# Patient Record
Sex: FEMALE | Race: WHITE
[De-identification: names, ages, dates, MRNs, and addresses within clinical notes are randomized per-mention and may not be internally consistent; named-entity substitution may affect disease eponyms.]

---

## 2017-04-27 LAB
ANION GAP SERPL CALC-SCNC: 3 MMOL/L (ref 3–11)
BASOPHILS # BLD: 0.03 K/UL (ref 0–0.2)
BASOPHILS NFR BLD: 0.5 %
BUN SERPL-MCNC: 21 MG/DL (ref 7–18)
BUN/CREAT SERPL: 6.1 (ref 10–20)
CALCIUM SERPL-MCNC: 8.9 MG/DL (ref 8.5–10.1)
CHLORIDE SERPL-SCNC: 103 MMOL/L (ref 98–107)
CO2 SERPL-SCNC: 34 MMOL/L (ref 21–32)
COMPLETE: YES
CREAT CL PREDICTED SERPL C-G-VRATE: 9.5 ML/MIN
CREAT SERPL-MCNC: 3.5 MG/DL (ref 0.6–1.2)
EOSINOPHIL NFR BLD AUTO: 201 K/UL (ref 130–400)
GLUCOSE SERPL-MCNC: 84 MG/DL (ref 70–99)
HCT VFR BLD CALC: 32.4 % (ref 37–47)
IG%: 0.2 %
IMM GRANULOCYTES NFR BLD AUTO: 24.6 %
LYMPHOCYTES # BLD: 1.36 K/UL (ref 1.2–3.4)
MCH RBC QN AUTO: 30.7 PG (ref 25–34)
MCHC RBC AUTO-ENTMCNC: 32.7 G/DL (ref 32–36)
MCV RBC AUTO: 93.9 FL (ref 80–100)
MONOCYTES NFR BLD: 8.5 %
NEUTROPHILS # BLD AUTO: 2.2 %
NEUTROPHILS NFR BLD AUTO: 64 %
PMV BLD AUTO: 10 FL (ref 7.4–10.4)
POTASSIUM SERPL-SCNC: 3.5 MMOL/L (ref 3.5–5.1)
RBC # BLD AUTO: 3.45 M/UL (ref 4.2–5.4)
SODIUM SERPL-SCNC: 140 MMOL/L (ref 136–145)
WBC # BLD AUTO: 5.52 K/UL (ref 4.8–10.8)

## 2017-04-27 NOTE — PAT MEDICATION INSTRUCTIONS
Service Date


Apr 27, 2017.





Current Home Medication List


Acetaminophen (Tylenol), 650 MG PO Q4 PRN for Pain


Amlodipine (Norvasc), 5 MG PO BID


Carvedilol (Coreg), 25 MG PO BID


Cholecalciferol (Vitamin D3), 1 CAP PO QAM


Ondansetron Hcl (Zofran), 8 MG PO UD


Simethicone (Gas Relief Extra Strength), 1 CAP PO DAILY PRN for PRN


Sulfa/Trimethoprim (Bactrim Ds 800MG/160MG), 1 TAB PO QAM


[Trinephrocaps], 1 MG PO HS





Medication Instructions


For Your Scheduled Surgery 





Sulfa/Trimethoprim (Bactrim Ds 800MG/160MG), 1 TAB PO QAM (taking due to UTI- 

will finish prior to surgery)








- Hold the following medications the morning of surgery:


   Simethicone (Gas Relief Extra Strength), 1 CAP PO DAILY PRN for PRN


   Cholecalciferol (Vitamin D3), 1 CAP PO QAM





- Take the following medications the morning of surgery with a sip of water:


   Ondansetron Hcl (Zofran), 8 MG PO UD (only if needed)


   Acetaminophen (Tylenol), 650 MG PO Q4 PRN for Pain (if needed)


   Amlodipine (Norvasc), 5 MG PO BID


   Carvedilol (Coreg), 25 MG PO BID





- Take the following medications as scheduled the night before surgery:


   Simethicone (Gas Relief Extra Strength), 1 CAP PO DAILY PRN for PRN


   Ondansetron Hcl (Zofran), 8 MG PO UD


   Trinephrocaps 1 MG PO HS


   Acetaminophen (Tylenol), 650 MG PO Q4 PRN for Pain


   Amlodipine (Norvasc), 5 MG PO BID


   Carvedilol (Coreg), 25 MG PO BID











If you have any questions please call us at [phone] or [phone] (

[facility]) or [phone]

## 2017-05-11 ENCOUNTER — HOSPITAL ENCOUNTER (OUTPATIENT)
Dept: HOSPITAL 45 - C.ACU | Age: 82
Discharge: HOME | End: 2017-05-11
Attending: UROLOGY
Payer: COMMERCIAL

## 2017-05-11 VITALS
TEMPERATURE: 97.52 F | OXYGEN SATURATION: 96 % | HEART RATE: 56 BPM | DIASTOLIC BLOOD PRESSURE: 69 MMHG | SYSTOLIC BLOOD PRESSURE: 157 MMHG

## 2017-05-11 VITALS
DIASTOLIC BLOOD PRESSURE: 53 MMHG | TEMPERATURE: 97.88 F | OXYGEN SATURATION: 98 % | SYSTOLIC BLOOD PRESSURE: 164 MMHG | HEART RATE: 61 BPM

## 2017-05-11 VITALS
OXYGEN SATURATION: 96 % | SYSTOLIC BLOOD PRESSURE: 141 MMHG | TEMPERATURE: 97.7 F | DIASTOLIC BLOOD PRESSURE: 66 MMHG | HEART RATE: 56 BPM

## 2017-05-11 VITALS
HEIGHT: 62.99 IN | BODY MASS INDEX: 20.55 KG/M2 | BODY MASS INDEX: 20.55 KG/M2 | WEIGHT: 115.96 LBS | WEIGHT: 115.96 LBS | HEIGHT: 62.99 IN

## 2017-05-11 VITALS
OXYGEN SATURATION: 94 % | HEART RATE: 54 BPM | SYSTOLIC BLOOD PRESSURE: 159 MMHG | DIASTOLIC BLOOD PRESSURE: 67 MMHG | TEMPERATURE: 97.88 F

## 2017-05-11 VITALS — DIASTOLIC BLOOD PRESSURE: 66 MMHG | OXYGEN SATURATION: 94 % | HEART RATE: 56 BPM | SYSTOLIC BLOOD PRESSURE: 159 MMHG

## 2017-05-11 VITALS
SYSTOLIC BLOOD PRESSURE: 153 MMHG | DIASTOLIC BLOOD PRESSURE: 63 MMHG | TEMPERATURE: 97.88 F | OXYGEN SATURATION: 94 % | HEART RATE: 56 BPM

## 2017-05-11 DIAGNOSIS — Z85.3: ICD-10-CM

## 2017-05-11 DIAGNOSIS — C78.00: ICD-10-CM

## 2017-05-11 DIAGNOSIS — C65.2: ICD-10-CM

## 2017-05-11 DIAGNOSIS — I10: ICD-10-CM

## 2017-05-11 DIAGNOSIS — E78.00: ICD-10-CM

## 2017-05-11 DIAGNOSIS — I09.9: ICD-10-CM

## 2017-05-11 DIAGNOSIS — R91.1: ICD-10-CM

## 2017-05-11 DIAGNOSIS — N39.0: ICD-10-CM

## 2017-05-11 DIAGNOSIS — C67.5: Primary | ICD-10-CM

## 2017-05-11 LAB
ANION GAP SERPL CALC-SCNC: 7 MMOL/L (ref 3–11)
BUN SERPL-MCNC: 21 MG/DL (ref 7–18)
BUN/CREAT SERPL: 7.6 (ref 10–20)
CALCIUM SERPL-MCNC: 8.9 MG/DL (ref 8.5–10.1)
CHLORIDE SERPL-SCNC: 102 MMOL/L (ref 98–107)
CO2 SERPL-SCNC: 31 MMOL/L (ref 21–32)
CREAT CL PREDICTED SERPL C-G-VRATE: 11.9 ML/MIN
CREAT SERPL-MCNC: 2.8 MG/DL (ref 0.6–1.2)
GLUCOSE SERPL-MCNC: 104 MG/DL (ref 70–99)
POTASSIUM SERPL-SCNC: 3.5 MMOL/L (ref 3.5–5.1)
SODIUM SERPL-SCNC: 140 MMOL/L (ref 136–145)

## 2017-05-11 NOTE — OPERATIVE REPORT
DATE OF OPERATION:  05/11/2017

 

PREOPERATIVE DIAGNOSIS:  Recurrent bladder tumor.

 

POSTOPERATIVE DIAGNOSIS:  Recurrent circumferential bladder neck tumor.

 

PROCEDURE:  Transurethral resection of bladder tumor and instillation of

mitomycin C, 5-6 cm of total tumor volume circumferentially.

 

SURGEON:  Dr. Isak Nelson.

 

ASSISTANT:  None.

 

ANESTHESIA:  General anesthesia with laryngeal mask.

 

COMPLICATIONS:  None.

 

ESTIMATED BLOOD LOSS:  10 mL.

 

SPECIMENS SENT TO PATHOLOGY:  Bladder neck tumor and deeper tumor resection.

 

DRAINS LEFT IN PLACE:  Include a 16 Polish catheter to gravity drainage with

10 mL of sterile water in the balloon with 40 mg of mitomycin and 40 mL of

water.

 

FINDINGS:  Circumferential bladder neck involvement of the tumor.  All distal

to the ureteral orifices with no deeper bladder involvement, resected and

fulgurated in its entirety.  Intravesical mitomycin x2 hours postoperatively

40 mg.

 

BRIEF HISTORY:  Ms. Ortiz is a pleasant 86-year-old  female who I

have seen previously for history of left urothelial carcinoma resected with a

nephroureterectomy in February 2016 and a subsequent bladder recurrence in

July of 2016 who  has been followed as an outpatient.  She has recently

started external beam radiation therapy for  small lung lesions unchanged on

recent CT scan.  She is being brought back into the office seeing the

presence of a bladder recurrence on her screening office cystoscopy. 

Previous histology was high grade a year ago.  

 

Intravenous ciprofloxacin was provided for antibiotic coverage.  The patient

has been covered with doxycycline for positive urine culture preoperatively. 

SCDs used for DVT prophylaxis.  Informed consent reviewed with the patient

and family preoperatively today.

 

OPERATION AND FINDINGS: 

 

PROCEDURE:  The patient was properly identified and brought to the operative

suite.  After identification and appropriate consent on the chart, general

anesthesia with laryngeal mask was initiated.  The patient was prepped and

draped in standard fashion for this procedure.  Full time-out procedure was

followed.  Monopolar resectoscope was introduced into the bladder using a

visual obturator under direct visualization and bladder was surveyed in its

entirety including 30 and 70 degree lenses.  This demonstrated a

circumferential bladder neck  abnormal tissue with the right ureteral orifice

being well removed from the level of involvement.  Bladder dome, lateral

walls and essentially the rest of the bladder were negative for lesions.  The

site of the prior left ureteral orifice had been resected previously and is

surgically absent.  Using a monopolar loop  the tumor was circumferentially

resected.  Unfortunately, in some areas this progressed into the proximal

urethra and required resection and fulguration at this place as  well.  After

all visible tumor had been resected and flushed free with  deeper specimens

being sent for separate pathologic analysis the area was fulgurated and

excellent hemostasis was obtained.  There was some concern  for the need for

circumferential bladder neck and transurethral resection and fulguration for

both incontinence and future bladder neck stenosis and bladder outlet

obstruction.  After this was complete, bladder was surveyed and noted to be

free of any tumor or  additional abnormal tissue.  Bladder was drained and

resectoscope was removed.  A 16 Polish Gallegos catheter was placed and 40 mg of

mitomycin and 40 mL water were instilled and catheter was  plugged.  Ten mL

sterile water had been placed in the balloon.  Anesthesia was reversed.  The

patient was transferred to recovery room in stable condition.

 

FOLLOW-UP CARE:  Bladder to be drained after 2 hours of instillation.  Trial

of void prior to discharge home.  She is to complete her doxycycline as

planned.  Percocet for postoperative analgesia is provided.  Outpatient

appointments are confirmed.  The patient is instructed to contact us should

she note any fevers, chills, nausea, vomiting or other significant

difficulties in the postoperative period.

 

 

I attest to the content of the Intraoperative Record and any orders documented therein. Any exceptio
ns are noted below.

## 2017-05-11 NOTE — [facility] POST OPERATIVE BRIEF NOTE
Immediate Operative Summary


Operative Date


May 11, 2017.





Pre-Operative Diagnosis





Malignant neoplasm of bladder





Post-Operative Diagnosis





Malignant neoplasm of bladder, bladder neck tumor





Procedure(s) Performed





Transuretheral Resection Bladder Tumor, Mytomycin Installation





Surgeon


Dr. Isak Nelson





Assistant Surgeon(s)


None





Estimated Blood Loss


10 cc





Findings


Circumferential bladder neck involvement of tumor all distal to UO, resected 

and fulgurated entirely, 40 cc Mitomycin instilled in 40 cc H2O.





Specimens





A: Bladder Neck Tumor





B: Deeper Tumor





Drains


16 fr polk 10 cc H2O, plugged





Anesthesia


GALMA





Complication(s)


None





Disposition


Recovery Room / PACU

## 2017-05-11 NOTE — ANESTHESIOLOGY PROGRESS NOTE
Anesthesia Post Op Note


Date & Time


May 11, 2017 at 08:51





Vital Signs


Pain Intensity:  0





 Vital Signs Past 12 Hours








  Date Time  Temp Pulse Resp B/P Pulse Ox O2 Delivery O2 Flow Rate FiO2


 


5/11/17 08:37 36.4 59 17 148/47 95 Room Air  


 


5/11/17 08:36  60 20     


 


5/11/17 08:36  60 20  96   


 


5/11/17 08:35    152/49    


 


5/11/17 08:31  57 15  98   


 


5/11/17 08:31  57 15     


 


5/11/17 08:30    145/46    


 


5/11/17 08:26  57 18  100   


 


5/11/17 08:26  58 18     


 


5/11/17 08:25  57 14 144/48 100   


 


5/11/17 08:25  57 14     


 


5/11/17 08:25  57 14 144/48 100   


 


5/11/17 08:25  57 14     


 


5/11/17 08:20  56 10     


 


5/11/17 08:20  55 10 135/47 100   


 


5/11/17 08:20  55 10 135/47 100   


 


5/11/17 08:20  56 10     


 


5/11/17 08:15  57 4 146/47 100   


 


5/11/17 08:15  57 4     


 


5/11/17 08:15  57 4 146/47 100   


 


5/11/17 08:15 36.5 57 16 146/47 100 Mask 10 


 


5/11/17 08:15  57 4     


 


5/11/17 05:57 36.6 61 18 164/53 98 Room Air  











Notes


Mental Status:  alert / awake / arousable, participated in evaluation


Pt Amnestic to Procedure:  Yes


Nausea / Vomiting:  adequately controlled


Pain:  adequately controlled


Airway Patency, RR, SpO2:  stable & adequate


BP & HR:  stable & adequate


Hydration State:  stable & adequate


Anesthetic Complications:  no major complications apparent

## 2017-05-11 NOTE — DISCHARGE INSTRUCTIONS
Discharge Instructions


Date of Service


May 11, 2017.





Admission


Reason for Admission:  Bladder Cancer





Discharge


Discharge Diagnosis / Problem:  Bladder cancer s/p TURBT





Discharge Goals


Goal(s):  Improve disease control, Therapeutic intervention





Activity Recommendations


Activity Limitations:  per Instructions/Follow-up section


Lifting Limitations:  no more than 25 pounds, gradually increase as tolerated


Exercise/Sports Limitations:  rest today, gradually increase as tolerated


May Resume Sexual Activity:  when tolerated


Shower/Bathe:  no limitations


Driving or Machine Use:  resume 1 day after discharge





.





Instructions / Follow-Up


Instructions / Follow-Up


Urination might be difficult or change after procedure





Complete doxycycline





Percocet as needed for pain





Discharge Diet


Recommended Diet:  Renal Diet (good fluid intake)





Procedures


Procedures Performed:  


Transuretheral Resection Bladder Tumor, Mytomycin Installation





Pending Studies


Studies pending at discharge:  yes


List of pending studies:  


Pathology results





Medical Emergencies








.


Who to Call and When:





Medical Emergencies:  If at any time you feel your situation is an emergency, 

please call 911 immediately.





.





Non-Emergent Contact


Non-Emergency issues call your:  Urologist


Call Non-Emergent contact if:  you have a fever, temperature is above 101, your 

pain is not controlled, your pain is worsening, your pain is unusual for you, 

your pain is concerning you, wound has increased drainage, you have any 

medication questions





.


.





"Provider Documentation" section prepared by Isak Nelson.








.





VTE Core Measure


Inpt VTE Proph given/why not?:  SCD's





[place] Drug Monitoring Program


Search Results:  patient reviewed within database, no issues identified (last 

Rx a year ago)

## 2017-07-06 ENCOUNTER — HOSPITAL ENCOUNTER (OUTPATIENT)
Dept: HOSPITAL 45 - C.LABSPEC | Age: 82
Discharge: HOME | End: 2017-07-06
Attending: NURSE PRACTITIONER
Payer: COMMERCIAL

## 2017-07-06 DIAGNOSIS — N39.0: Primary | ICD-10-CM

## 2017-11-09 ENCOUNTER — HOSPITAL ENCOUNTER (OUTPATIENT)
Dept: HOSPITAL 45 - C.LABSPEC | Age: 82
Discharge: HOME | End: 2017-11-09
Attending: UROLOGY
Payer: COMMERCIAL

## 2017-11-09 DIAGNOSIS — N39.0: Primary | ICD-10-CM

## 2018-01-02 ENCOUNTER — HOSPITAL ENCOUNTER (OUTPATIENT)
Dept: HOSPITAL 45 - C.LABSPEC | Age: 83
Discharge: HOME | End: 2018-01-02
Attending: UROLOGY
Payer: COMMERCIAL

## 2018-01-02 DIAGNOSIS — N39.0: Primary | ICD-10-CM

## 2018-01-02 DIAGNOSIS — C65.2: ICD-10-CM

## 2018-07-18 ENCOUNTER — HOSPITAL ENCOUNTER (OUTPATIENT)
Dept: HOSPITAL 45 - C.LABSPEC | Age: 83
Discharge: HOME | End: 2018-07-18
Attending: UROLOGY
Payer: COMMERCIAL

## 2018-07-18 DIAGNOSIS — N39.0: Primary | ICD-10-CM
